# Patient Record
Sex: FEMALE | URBAN - METROPOLITAN AREA
[De-identification: names, ages, dates, MRNs, and addresses within clinical notes are randomized per-mention and may not be internally consistent; named-entity substitution may affect disease eponyms.]

---

## 2020-10-06 NOTE — HPI: BLISTERS
How Severe Are Your Blisters?: mild
Please Check The Phrase That Best Describes Your Blisters.: generalized
Is This A New Presentation, Or A Follow-Up?: Blisters
Additional History: Patient has had blisters since she was 5 years of age she has used home remedies to soothe but never has seen dr to get medication she is clear today with nothing to show

## 2020-10-06 NOTE — PROCEDURE: TREATMENT REGIMEN
Plan: We will monitor for any changes in size and or color and and concerns
Plan: Patient is clear today, but will call office as soon as she flares with blister and patient will come in for biopsy.
Plan: We will monitor for any changes in size and or color and concers
Detail Level: Detailed
Plan: Patient will get clearance to start doxycycline tablets.

## 2020-10-06 NOTE — HPI: RASH
What Type Of Note Output Would You Prefer (Optional)?: Standard Output
Is The Patient Presenting As Previously Scheduled?: Yes
How Severe Is Your Rash?: mild
Is This A New Presentation, Or A Follow-Up?: Rash
Additional History: Patient states she started getting rash since the use of face mask started

## 2021-04-22 NOTE — PROCEDURE: INTRAMUSCULAR KENALOG
Administered By (Optional): Wendi
Kenalog Preparation: kenalog
Add Option For Additional Mediation: No
Ndc# (Optional): [id number]
Total Volume (Ccs): 1
Concentration (Mg/Ml) Of Additional Medication: 2.5
Detail Level: None
Consent: The risks of atrophy were reviewed with the patient.
Concentration (Mg/Ml): 40.0

## 2021-04-22 NOTE — HPI: RASH
How Severe Is Your Rash?: mild
Is This A New Presentation, Or A Follow-Up?: Rash
Additional History: On March 23rd patient reports she received her first maderna vaccine, then she received her second one two days ago on April 20th. Patient reports rash started on the 9th of april

## 2021-04-22 NOTE — PROCEDURE: TREATMENT REGIMEN
Initiate Treatment: hydroxyzine HCl 10 mg tablet Take 1 tablets at bed time for itching\\n\\nXyzal 5 mg tablet Take 1  tablet by mouth in the morning\\n\\nmometasone 0.1 % topical cream Apply a thin amount to arms twice daily 2 weeks\\n\\ntriamcinolone acetonide 0.1 % topical ointment Apply a thin layer to affected areas on body twice daily for 3 weeks
Detail Level: Detailed
Plan: If not improved at next visit we will perform biopsy

## 2023-07-06 ENCOUNTER — APPOINTMENT (OUTPATIENT)
Dept: URBAN - METROPOLITAN AREA CLINIC 310 | Age: 43
Setting detail: DERMATOLOGY
End: 2023-07-06

## 2023-07-06 DIAGNOSIS — L30.9 DERMATITIS, UNSPECIFIED: ICD-10-CM

## 2023-07-06 PROCEDURE — OTHER COUNSELING: OTHER

## 2023-07-06 PROCEDURE — OTHER PRESCRIPTION MEDICATION MANAGEMENT: OTHER

## 2023-07-06 PROCEDURE — 11104 PUNCH BX SKIN SINGLE LESION: CPT

## 2023-07-06 PROCEDURE — OTHER BIOPSY BY PUNCH METHOD: OTHER

## 2023-07-06 PROCEDURE — 11105 PUNCH BX SKIN EA SEP/ADDL: CPT

## 2023-07-06 PROCEDURE — OTHER PHOTO-DOCUMENTATION: OTHER

## 2023-07-06 PROCEDURE — OTHER MIPS QUALITY: OTHER

## 2023-07-06 ASSESSMENT — LOCATION DETAILED DESCRIPTION DERM
LOCATION DETAILED: LEFT LATERAL DORSAL FOOT
LOCATION DETAILED: RIGHT DORSAL GREAT TOE

## 2023-07-06 ASSESSMENT — LOCATION SIMPLE DESCRIPTION DERM
LOCATION SIMPLE: LEFT FOOT
LOCATION SIMPLE: RIGHT GREAT TOE

## 2023-07-06 ASSESSMENT — LOCATION ZONE DERM
LOCATION ZONE: TOE
LOCATION ZONE: FEET

## 2023-07-06 NOTE — PROCEDURE: BIOPSY BY PUNCH METHOD
Detail Level: Detailed
Was A Bandage Applied: Yes
Punch Size In Mm: 4
Size Of Lesion In Cm (Optional): 0
Depth Of Punch Biopsy: dermis
Biopsy Type: H and E
Anesthesia Type: 1% lidocaine with epinephrine
Anesthesia Volume In Cc (Will Not Render If 0): 0.5
Hemostasis: None
Epidermal Sutures: 5-0 Plain Gut
Number Of Epidermal Sutures (Optional): 1
Wound Care: Petrolatum
Dressing: bandage
Patient Will Remove Sutures At Home?: No
Lab: [id number]
Consent: Written consent was obtained and risks were reviewed including but not limited to scarring, infection, bleeding, scabbing, incomplete removal, nerve damage and allergy to anesthesia.
Post-Care Instructions: I reviewed with the patient in detail post-care instructions. Patient is to keep the biopsy site dry overnight, and then apply bacitracin twice daily until healed. Patient may apply hydrogen peroxide soaks to remove any crusting.
Home Suture Removal Text: Patient was provided a home suture removal kit and will remove their sutures at home.  If they have any questions or difficulties they will call the office.
Notification Instructions: Patient will be notified of biopsy results. However, patient instructed to call the office if not contacted within 2 weeks.
Billing Type: Third-Party Bill
Information: Selecting Yes will display possible errors in your note based on the variables you have selected. This validation is only offered as a suggestion for you. PLEASE NOTE THAT THE VALIDATION TEXT WILL BE REMOVED WHEN YOU FINALIZE YOUR NOTE. IF YOU WANT TO FAX A PRELIMINARY NOTE YOU WILL NEED TO TOGGLE THIS TO 'NO' IF YOU DO NOT WANT IT IN YOUR FAXED NOTE.
Punch Size In Mm: 3
Biopsy Type: DIF

## 2023-07-06 NOTE — PROCEDURE: PRESCRIPTION MEDICATION MANAGEMENT
Plan: Patient reports intermittent flares for several years in similar fashion as today’s presentation.  She would like to have tested for further evaluation.  We will wait until results are received before developing appropriate treatment plan, prescriptions and follow up timeframe.  Patient agrees with treatment plan
Render In Strict Bullet Format?: No
Detail Level: Zone

## 2023-07-18 ENCOUNTER — RX ONLY (RX ONLY)
Age: 43
End: 2023-07-18

## 2023-07-18 RX ORDER — CLOBETASOL PROPIONATE 0.5 MG/G
CREAM TOPICAL
Qty: 60 | Refills: 1 | Status: ERX | COMMUNITY
Start: 2023-07-18